# Patient Record
Sex: MALE | Race: WHITE | NOT HISPANIC OR LATINO | Employment: OTHER | URBAN - METROPOLITAN AREA
[De-identification: names, ages, dates, MRNs, and addresses within clinical notes are randomized per-mention and may not be internally consistent; named-entity substitution may affect disease eponyms.]

---

## 2019-10-01 ENCOUNTER — HOSPITAL ENCOUNTER (OUTPATIENT)
Facility: MEDICAL CENTER | Age: 34
End: 2019-10-02
Attending: EMERGENCY MEDICINE | Admitting: INTERNAL MEDICINE
Payer: OTHER MISCELLANEOUS

## 2019-10-01 ENCOUNTER — APPOINTMENT (OUTPATIENT)
Dept: RADIOLOGY | Facility: MEDICAL CENTER | Age: 34
End: 2019-10-01
Attending: STUDENT IN AN ORGANIZED HEALTH CARE EDUCATION/TRAINING PROGRAM
Payer: OTHER MISCELLANEOUS

## 2019-10-01 DIAGNOSIS — R55 SYNCOPE, UNSPECIFIED SYNCOPE TYPE: ICD-10-CM

## 2019-10-01 LAB
ALBUMIN SERPL BCP-MCNC: 4.6 G/DL (ref 3.2–4.9)
ALBUMIN/GLOB SERPL: 1.7 G/DL
ALP SERPL-CCNC: 83 U/L (ref 30–99)
ALT SERPL-CCNC: 16 U/L (ref 2–50)
AMPHET UR QL SCN: NEGATIVE
ANION GAP SERPL CALC-SCNC: 9 MMOL/L (ref 0–11.9)
APPEARANCE UR: CLEAR
AST SERPL-CCNC: 17 U/L (ref 12–45)
BARBITURATES UR QL SCN: NEGATIVE
BASOPHILS # BLD AUTO: 0.4 % (ref 0–1.8)
BASOPHILS # BLD: 0.06 K/UL (ref 0–0.12)
BENZODIAZ UR QL SCN: NEGATIVE
BILIRUB SERPL-MCNC: 0.4 MG/DL (ref 0.1–1.5)
BUN SERPL-MCNC: 17 MG/DL (ref 8–22)
BZE UR QL SCN: NEGATIVE
CALCIUM SERPL-MCNC: 9.2 MG/DL (ref 8.5–10.5)
CANNABINOIDS UR QL SCN: NEGATIVE
CHLORIDE SERPL-SCNC: 103 MMOL/L (ref 96–112)
CO2 SERPL-SCNC: 26 MMOL/L (ref 20–33)
COLOR UR AUTO: YELLOW
CREAT SERPL-MCNC: 0.96 MG/DL (ref 0.5–1.4)
D DIMER PPP IA.FEU-MCNC: <0.4 UG/ML (FEU) (ref 0–0.5)
EKG IMPRESSION: NORMAL
EOSINOPHIL # BLD AUTO: 0.08 K/UL (ref 0–0.51)
EOSINOPHIL NFR BLD: 0.6 % (ref 0–6.9)
ERYTHROCYTE [DISTWIDTH] IN BLOOD BY AUTOMATED COUNT: 42.2 FL (ref 35.9–50)
GLOBULIN SER CALC-MCNC: 2.7 G/DL (ref 1.9–3.5)
GLUCOSE SERPL-MCNC: 98 MG/DL (ref 65–99)
GLUCOSE UR QL STRIP.AUTO: NEGATIVE MG/DL
HCT VFR BLD AUTO: 44.1 % (ref 42–52)
HGB BLD-MCNC: 14.8 G/DL (ref 14–18)
IMM GRANULOCYTES # BLD AUTO: 0.05 K/UL (ref 0–0.11)
IMM GRANULOCYTES NFR BLD AUTO: 0.4 % (ref 0–0.9)
KETONES UR QL STRIP.AUTO: NEGATIVE MG/DL
LEUKOCYTE ESTERASE UR QL STRIP.AUTO: NEGATIVE
LYMPHOCYTES # BLD AUTO: 1.41 K/UL (ref 1–4.8)
LYMPHOCYTES NFR BLD: 10 % (ref 22–41)
MCH RBC QN AUTO: 30.1 PG (ref 27–33)
MCHC RBC AUTO-ENTMCNC: 33.6 G/DL (ref 33.7–35.3)
MCV RBC AUTO: 89.6 FL (ref 81.4–97.8)
METHADONE UR QL SCN: NEGATIVE
MONOCYTES # BLD AUTO: 1.14 K/UL (ref 0–0.85)
MONOCYTES NFR BLD AUTO: 8.1 % (ref 0–13.4)
NEUTROPHILS # BLD AUTO: 11.33 K/UL (ref 1.82–7.42)
NEUTROPHILS NFR BLD: 80.5 % (ref 44–72)
NITRITE UR QL STRIP.AUTO: NEGATIVE
NRBC # BLD AUTO: 0 K/UL
NRBC BLD-RTO: 0 /100 WBC
OPIATES UR QL SCN: NEGATIVE
OXYCODONE UR QL SCN: NEGATIVE
PCP UR QL SCN: NEGATIVE
PH UR STRIP.AUTO: 7 [PH] (ref 5–8)
PLATELET # BLD AUTO: 301 K/UL (ref 164–446)
PMV BLD AUTO: 10.9 FL (ref 9–12.9)
POTASSIUM SERPL-SCNC: 4 MMOL/L (ref 3.6–5.5)
PROPOXYPH UR QL SCN: NEGATIVE
PROT SERPL-MCNC: 7.3 G/DL (ref 6–8.2)
PROT UR QL STRIP: NEGATIVE MG/DL
RBC # BLD AUTO: 4.92 M/UL (ref 4.7–6.1)
RBC UR QL AUTO: ABNORMAL
SODIUM SERPL-SCNC: 138 MMOL/L (ref 135–145)
SP GR UR: 1.01 (ref 1–1.03)
TROPONIN T SERPL-MCNC: <6 NG/L (ref 6–19)
TROPONIN T SERPL-MCNC: <6 NG/L (ref 6–19)
WBC # BLD AUTO: 14.1 K/UL (ref 4.8–10.8)

## 2019-10-01 PROCEDURE — 93005 ELECTROCARDIOGRAM TRACING: CPT

## 2019-10-01 PROCEDURE — 99220 PR INITIAL OBSERVATION CARE,LEVL III: CPT | Performed by: INTERNAL MEDICINE

## 2019-10-01 PROCEDURE — G0378 HOSPITAL OBSERVATION PER HR: HCPCS

## 2019-10-01 PROCEDURE — 81002 URINALYSIS NONAUTO W/O SCOPE: CPT

## 2019-10-01 PROCEDURE — 99285 EMERGENCY DEPT VISIT HI MDM: CPT

## 2019-10-01 PROCEDURE — 700105 HCHG RX REV CODE 258: Performed by: INTERNAL MEDICINE

## 2019-10-01 PROCEDURE — 700105 HCHG RX REV CODE 258: Performed by: STUDENT IN AN ORGANIZED HEALTH CARE EDUCATION/TRAINING PROGRAM

## 2019-10-01 PROCEDURE — 80307 DRUG TEST PRSMV CHEM ANLYZR: CPT

## 2019-10-01 PROCEDURE — 85379 FIBRIN DEGRADATION QUANT: CPT

## 2019-10-01 PROCEDURE — 80053 COMPREHEN METABOLIC PANEL: CPT

## 2019-10-01 PROCEDURE — 70450 CT HEAD/BRAIN W/O DYE: CPT

## 2019-10-01 PROCEDURE — 93005 ELECTROCARDIOGRAM TRACING: CPT | Performed by: EMERGENCY MEDICINE

## 2019-10-01 PROCEDURE — 81002 URINALYSIS NONAUTO W/O SCOPE: CPT | Performed by: STUDENT IN AN ORGANIZED HEALTH CARE EDUCATION/TRAINING PROGRAM

## 2019-10-01 PROCEDURE — 84484 ASSAY OF TROPONIN QUANT: CPT

## 2019-10-01 PROCEDURE — 85025 COMPLETE CBC W/AUTO DIFF WBC: CPT

## 2019-10-01 RX ORDER — AMOXICILLIN 250 MG
2 CAPSULE ORAL 2 TIMES DAILY
Status: DISCONTINUED | OUTPATIENT
Start: 2019-10-01 | End: 2019-10-02 | Stop reason: HOSPADM

## 2019-10-01 RX ORDER — SODIUM CHLORIDE 9 MG/ML
INJECTION, SOLUTION INTRAVENOUS CONTINUOUS
Status: DISCONTINUED | OUTPATIENT
Start: 2019-10-01 | End: 2019-10-02 | Stop reason: HOSPADM

## 2019-10-01 RX ORDER — ONDANSETRON 2 MG/ML
4 INJECTION INTRAMUSCULAR; INTRAVENOUS EVERY 4 HOURS PRN
Status: DISCONTINUED | OUTPATIENT
Start: 2019-10-01 | End: 2019-10-02 | Stop reason: HOSPADM

## 2019-10-01 RX ORDER — ENALAPRILAT 1.25 MG/ML
1.25 INJECTION INTRAVENOUS EVERY 6 HOURS PRN
Status: DISCONTINUED | OUTPATIENT
Start: 2019-10-01 | End: 2019-10-02 | Stop reason: HOSPADM

## 2019-10-01 RX ORDER — ONDANSETRON 4 MG/1
4 TABLET, ORALLY DISINTEGRATING ORAL EVERY 4 HOURS PRN
Status: DISCONTINUED | OUTPATIENT
Start: 2019-10-01 | End: 2019-10-02 | Stop reason: HOSPADM

## 2019-10-01 RX ORDER — PROMETHAZINE HYDROCHLORIDE 25 MG/1
12.5-25 SUPPOSITORY RECTAL EVERY 4 HOURS PRN
Status: DISCONTINUED | OUTPATIENT
Start: 2019-10-01 | End: 2019-10-02 | Stop reason: HOSPADM

## 2019-10-01 RX ORDER — SODIUM CHLORIDE 9 MG/ML
20 INJECTION, SOLUTION INTRAVENOUS ONCE
Status: COMPLETED | OUTPATIENT
Start: 2019-10-01 | End: 2019-10-01

## 2019-10-01 RX ORDER — POLYETHYLENE GLYCOL 3350 17 G/17G
1 POWDER, FOR SOLUTION ORAL
Status: DISCONTINUED | OUTPATIENT
Start: 2019-10-01 | End: 2019-10-02 | Stop reason: HOSPADM

## 2019-10-01 RX ORDER — PROCHLORPERAZINE EDISYLATE 5 MG/ML
5-10 INJECTION INTRAMUSCULAR; INTRAVENOUS EVERY 4 HOURS PRN
Status: DISCONTINUED | OUTPATIENT
Start: 2019-10-01 | End: 2019-10-02 | Stop reason: HOSPADM

## 2019-10-01 RX ORDER — PROMETHAZINE HYDROCHLORIDE 25 MG/1
12.5-25 TABLET ORAL EVERY 4 HOURS PRN
Status: DISCONTINUED | OUTPATIENT
Start: 2019-10-01 | End: 2019-10-02 | Stop reason: HOSPADM

## 2019-10-01 RX ORDER — BISACODYL 10 MG
10 SUPPOSITORY, RECTAL RECTAL
Status: DISCONTINUED | OUTPATIENT
Start: 2019-10-01 | End: 2019-10-02 | Stop reason: HOSPADM

## 2019-10-01 RX ORDER — ACETAMINOPHEN 325 MG/1
650 TABLET ORAL EVERY 6 HOURS PRN
Status: DISCONTINUED | OUTPATIENT
Start: 2019-10-01 | End: 2019-10-02 | Stop reason: HOSPADM

## 2019-10-01 RX ADMIN — SODIUM CHLORIDE: 9 INJECTION, SOLUTION INTRAVENOUS at 22:33

## 2019-10-01 RX ADMIN — SODIUM CHLORIDE 1598 ML: 9 INJECTION, SOLUTION INTRAVENOUS at 17:04

## 2019-10-01 SDOH — HEALTH STABILITY: MENTAL HEALTH: HOW OFTEN DO YOU HAVE 6 OR MORE DRINKS ON ONE OCCASION?: NEVER

## 2019-10-01 SDOH — HEALTH STABILITY: MENTAL HEALTH: HOW OFTEN DO YOU HAVE A DRINK CONTAINING ALCOHOL?: MONTHLY OR LESS

## 2019-10-01 SDOH — HEALTH STABILITY: MENTAL HEALTH: HOW MANY STANDARD DRINKS CONTAINING ALCOHOL DO YOU HAVE ON A TYPICAL DAY?: 1 OR 2

## 2019-10-01 ASSESSMENT — ENCOUNTER SYMPTOMS
STRIDOR: 0
NAUSEA: 0
CONSTIPATION: 0
ABDOMINAL PAIN: 0
CHILLS: 0
DIZZINESS: 1
FALLS: 0
DEPRESSION: 0
VOMITING: 0
HEADACHES: 0
MYALGIAS: 0
WEAKNESS: 0
FEVER: 0
SPUTUM PRODUCTION: 0
SHORTNESS OF BREATH: 0
LOSS OF CONSCIOUSNESS: 1
TINGLING: 0
PALPITATIONS: 0
COUGH: 0
DIARRHEA: 0

## 2019-10-01 ASSESSMENT — PATIENT HEALTH QUESTIONNAIRE - PHQ9
2. FEELING DOWN, DEPRESSED, IRRITABLE, OR HOPELESS: NOT AT ALL
2. FEELING DOWN, DEPRESSED, IRRITABLE, OR HOPELESS: NOT AT ALL
1. LITTLE INTEREST OR PLEASURE IN DOING THINGS: NOT AT ALL
SUM OF ALL RESPONSES TO PHQ9 QUESTIONS 1 AND 2: 0
SUM OF ALL RESPONSES TO PHQ9 QUESTIONS 1 AND 2: 0

## 2019-10-01 ASSESSMENT — COPD QUESTIONNAIRES
DURING THE PAST 4 WEEKS HOW MUCH DID YOU FEEL SHORT OF BREATH: NONE/LITTLE OF THE TIME
HAVE YOU SMOKED AT LEAST 100 CIGARETTES IN YOUR ENTIRE LIFE: NO/DON'T KNOW
DO YOU EVER COUGH UP ANY MUCUS OR PHLEGM?: NO/ONLY WITH OCCASIONAL COLDS OR INFECTIONS
COPD SCREENING SCORE: 0
IN THE PAST 12 MONTHS DO YOU DO LESS THAN YOU USED TO BECAUSE OF YOUR BREATHING PROBLEMS: DISAGREE/UNSURE

## 2019-10-01 ASSESSMENT — LIFESTYLE VARIABLES
TOTAL SCORE: 0
HAVE YOU EVER FELT YOU SHOULD CUT DOWN ON YOUR DRINKING: NO
ALCOHOL_USE: YES
HOW MANY TIMES IN THE PAST YEAR HAVE YOU HAD 5 OR MORE DRINKS IN A DAY: 0
AVERAGE NUMBER OF DAYS PER WEEK YOU HAVE A DRINK CONTAINING ALCOHOL: 1
EVER_SMOKED: NEVER
DOES PATIENT WANT TO STOP DRINKING: NO
TOTAL SCORE: 0
CONSUMPTION TOTAL: NEGATIVE
EVER HAD A DRINK FIRST THING IN THE MORNING TO STEADY YOUR NERVES TO GET RID OF A HANGOVER: NO
HAVE PEOPLE ANNOYED YOU BY CRITICIZING YOUR DRINKING: NO
EVER FELT BAD OR GUILTY ABOUT YOUR DRINKING: NO
ON A TYPICAL DAY WHEN YOU DRINK ALCOHOL HOW MANY DRINKS DO YOU HAVE: 2
TOTAL SCORE: 0

## 2019-10-01 NOTE — ED PROVIDER NOTES
ED Provider Note        CHIEF COMPLAINT  Chief Complaint   Patient presents with   • Syncope       HPI  Van Mayer is a 34 y.o. male who presents to the Emergency Department following syncopal event on airplane.  He was flying from [place] to [place] and the roughly 2 hours into the flight was noted to pass out.  This was witnessed by nurse and doctor were on the plane.  Patient denied any chest pain or shortness of breath prior to to or following the event.  He denies any recent sick symptoms, neurological changes, nausea, vomiting, diarrhea, changes to bowel movements or urination.  He states that he had a normal meal for breakfast.  Patient has no significant cardiac history no pulmonary history and no previous issues with blood glucose levels.  Patient denies any lower extremity swelling or pain and no family history of blood clots.  He denies any drug use, alcohol use, smoking history.     REVIEW OF SYSTEMS  Constitutional: negative for fever, weight loss, chills  Eyes: Negative for discharge, erythema  HENT: Negative for runny nose, congestion, sore throat  CV: Negative for cyanosis, chest pain, or history of murmur  Resp: Negative for cough, difficulty breathing, stridor  GI: Negative for abdominal pain, nausea, vomiting, diarrhea, constipation  : Negative for dysuria, hematuria, decreased urine output  Neuro: Negative for seizures, weakness  Skin: Negative for rash, wound  Psych: Negative for behavior problems       PAST MEDICAL HISTORY  The patient has no chronic medical history. Some seasonal and food allergy, no anaphylaxis    SURGICAL HISTORY  patient denies any surgical history    SOCIAL HISTORY  The patient was accompanied to the ED with friend.    CURRENT MEDICATIONS  Home Medications     Reviewed by Georges Morgan (Pharmacy Tech) on 10/01/19 at 1535  Med List Status: Complete   Medication Last Dose Status        Patient Josh Taking any Medications                  "      ALLERGIES  Allergies   Allergen Reactions   • Demerol      Hives all over body       PHYSICAL EXAM  VITAL SIGNS: /83   Pulse 100   Temp 36.7 °C (98.1 °F) (Temporal)   Resp 19   Ht 1.854 m (6' 1\")   Wt 99.8 kg (220 lb)   SpO2 96%   BMI 29.03 kg/m²     Constitutional: Alert in no apparent distress.   HENT: Normocephalic, Atraumatic, Bilateral external ears normal, Nose normal. Moist mucous membranes.  Eyes: Pupils are equal and reactive, Conjunctiva normal   Ears: Normal TM Bilaterally   Throat: Midline uvula, no exudate.  Neck: Normal range of motion, No tenderness, Supple, No stridor. No evidence of meningeal irritation.  Lymphatic: No lymphadenopathy noted.   Cardiovascular: Tachycardic regular rhythm, no murmurs.   Thorax & Lungs: Normal breath sounds, No respiratory distress, No wheezing.    Abdomen: Soft, No tenderness, No masses.  Skin: Warm, Dry, No erythema, No rash, No Petechiae.   Musculoskeletal: Good range of motion in all major joints. No tenderness to palpation or major deformities noted.   Neurologic: Alert, Normal motor function, Normal sensory function, No focal deficits noted.  Cranial nerves III through XII intact no deficits bilateral upper and lower extremity strength 5 out of 5 sensation intact in all peripheral nerve distributions  Psychiatric: non-toxic in appearance and behavior.     LABS  Results for orders placed or performed during the hospital encounter of 10/01/19   CBC WITH DIFFERENTIAL   Result Value Ref Range    WBC 14.1 (H) 4.8 - 10.8 K/uL    RBC 4.92 4.70 - 6.10 M/uL    Hemoglobin 14.8 14.0 - 18.0 g/dL    Hematocrit 44.1 42.0 - 52.0 %    MCV 89.6 81.4 - 97.8 fL    MCH 30.1 27.0 - 33.0 pg    MCHC 33.6 (L) 33.7 - 35.3 g/dL    RDW 42.2 35.9 - 50.0 fL    Platelet Count 301 164 - 446 K/uL    MPV 10.9 9.0 - 12.9 fL    Neutrophils-Polys 80.50 (H) 44.00 - 72.00 %    Lymphocytes 10.00 (L) 22.00 - 41.00 %    Monocytes 8.10 0.00 - 13.40 %    Eosinophils 0.60 0.00 - 6.90 %    " Basophils 0.40 0.00 - 1.80 %    Immature Granulocytes 0.40 0.00 - 0.90 %    Nucleated RBC 0.00 /100 WBC    Neutrophils (Absolute) 11.33 (H) 1.82 - 7.42 K/uL    Lymphs (Absolute) 1.41 1.00 - 4.80 K/uL    Monos (Absolute) 1.14 (H) 0.00 - 0.85 K/uL    Eos (Absolute) 0.08 0.00 - 0.51 K/uL    Baso (Absolute) 0.06 0.00 - 0.12 K/uL    Immature Granulocytes (abs) 0.05 0.00 - 0.11 K/uL    NRBC (Absolute) 0.00 K/uL   COMP METABOLIC PANEL   Result Value Ref Range    Sodium 138 135 - 145 mmol/L    Potassium 4.0 3.6 - 5.5 mmol/L    Chloride 103 96 - 112 mmol/L    Co2 26 20 - 33 mmol/L    Anion Gap 9.0 0.0 - 11.9    Glucose 98 65 - 99 mg/dL    Bun 17 8 - 22 mg/dL    Creatinine 0.96 0.50 - 1.40 mg/dL    Calcium 9.2 8.5 - 10.5 mg/dL    AST(SGOT) 17 12 - 45 U/L    ALT(SGPT) 16 2 - 50 U/L    Alkaline Phosphatase 83 30 - 99 U/L    Total Bilirubin 0.4 0.1 - 1.5 mg/dL    Albumin 4.6 3.2 - 4.9 g/dL    Total Protein 7.3 6.0 - 8.2 g/dL    Globulin 2.7 1.9 - 3.5 g/dL    A-G Ratio 1.7 g/dL   ESTIMATED GFR   Result Value Ref Range    GFR If African American >60 >60 mL/min/1.73 m 2    GFR If Non African American >60 >60 mL/min/1.73 m 2   TROPONIN   Result Value Ref Range    Troponin T <6 6 - 19 ng/L   D-DIMER   Result Value Ref Range    D-Dimer Screen <0.40 0.00 - 0.50 ug/mL (FEU)   URINE DRUG SCREEN   Result Value Ref Range    Amphetamines Urine Negative Negative    Barbiturates Negative Negative    Benzodiazepines Negative Negative    Cocaine Metabolite Negative Negative    Methadone Negative Negative    Opiates Negative Negative    Oxycodone Negative Negative    Phencyclidine -Pcp Negative Negative    Propoxyphene Negative Negative    Cannabinoid Metab Negative Negative   POC UA   Result Value Ref Range    POC Color Yellow     POC Appearance Clear     POC Glucose Negative Negative mg/dL    POC Ketones Negative Negative mg/dL    POC Specific Gravity 1.010 1.005 - 1.030    POC Blood Trace-intact (A) Negative    POC Urine PH 7.0 5.0 - 8.0     POC Protein Negative Negative mg/dL    POC Nitrites Negative Negative    POC Leukocyte Esterase Negative Negative   EKG (NOW)   Result Value Ref Range    Report       [facility] Emergency Dept.    Test Date:  2019-10-01  Pt Name:    KENNEDY HUANG                  Department: ER  MRN:        [id number]                      Room:       RD 03  Gender:     Male                         Technician: [id number]  :        1985                   Requested By:ER TRIAGE PROTOCOL  Order #:    [id number]                    Reading MD:    Measurements  Intervals                                Axis  Rate:       106                          P:          59  AL:         144                          QRS:        26  QRSD:       86                           T:          31  QT:         332  QTc:        441    Interpretive Statements  SINUS TACHYCARDIA  PROBABLE POSTERIOR INFARCT  No previous ECG available for comparison       EKG shows sinus tachycardia rate of 106.  No ST elevation, no ST depression.  QTC is 441.    All labs reviewed by me.    RADIOLOGY  CT-HEAD W/O   Final Result      No CT evidence of acute infarct, hemorrhage or mass.        The radiologist's interpretation of all radiological studies have been reviewed by me.    COURSE & MEDICAL DECISION MAKING  Nursing notes, VS, PMSFHx reviewed in chart.    4:57 PM - Patient seen and examined at bedside.  Given fluid bolus, ordered UDS, troponins x2, d-dimer, UA which have come back negative so far.  6:00 PM - Patient reevaluated at bedside still tachycardic in the low 100s.  Patient stated had headache following syncopal event with improvement will get CT scan head wo/ given otherwise negative work-up so far    Decision Making:  The patient will be admitted to the hospitalist service for IV fluids, echo and telemetry monitoring.  His initial cardiac work-up is negative, however the patient is here from out of town, and planning on going on a 15 mile hike and so  he will need to have fluids and be cleared here prior to going on the psych.    OUTPATIENT MEDICATIONS:  New Prescriptions    No medications on file       Caregiver was given return precautions and verbalizes understanding. They will return with patient for new or worsening symptoms.     FINAL IMPRESSION  Syncope    I independently evaluated the patient and repeated the important components of the history and physical. I discussed the management with the resident. I have reviewed and agree with the pertinent clinical information above including history, exam, study findings, and recommendations.     Dr. Sinclair will admit the pt

## 2019-10-01 NOTE — ED TRIAGE NOTES
Pt was flying from [place] to [place] when he had a syncopal event during the flight.  Pt stats that he remember becoming very lightheaded and then blacked out.  Per ems, pt was unresponsive for 2 minutes.  Pt currently aaox3 minus event, gcs 15.  Triage process explained to patient.  Pt back to waiting room.  Pt instructed to inform RN if any changes or questions arise.

## 2019-10-02 ENCOUNTER — APPOINTMENT (OUTPATIENT)
Dept: CARDIOLOGY | Facility: MEDICAL CENTER | Age: 34
End: 2019-10-02
Attending: INTERNAL MEDICINE
Payer: OTHER MISCELLANEOUS

## 2019-10-02 VITALS
WEIGHT: 224.87 LBS | SYSTOLIC BLOOD PRESSURE: 136 MMHG | RESPIRATION RATE: 15 BRPM | DIASTOLIC BLOOD PRESSURE: 83 MMHG | TEMPERATURE: 97.5 F | HEART RATE: 78 BPM | BODY MASS INDEX: 29.8 KG/M2 | HEIGHT: 73 IN | OXYGEN SATURATION: 98 %

## 2019-10-02 PROBLEM — Z78.9 ALCOHOL USE: Status: ACTIVE | Noted: 2019-10-02

## 2019-10-02 PROBLEM — Z72.0 TOBACCO USE: Status: ACTIVE | Noted: 2019-10-02

## 2019-10-02 PROBLEM — R55 SYNCOPE: Status: ACTIVE | Noted: 2019-10-02

## 2019-10-02 PROBLEM — D72.829 LEUKOCYTOSIS: Status: ACTIVE | Noted: 2019-10-02

## 2019-10-02 PROBLEM — R55 SYNCOPE: Status: RESOLVED | Noted: 2019-10-02 | Resolved: 2019-10-02

## 2019-10-02 PROBLEM — D72.829 LEUKOCYTOSIS: Status: RESOLVED | Noted: 2019-10-02 | Resolved: 2019-10-02

## 2019-10-02 LAB
ANION GAP SERPL CALC-SCNC: 5 MMOL/L (ref 0–11.9)
BUN SERPL-MCNC: 14 MG/DL (ref 8–22)
CALCIUM SERPL-MCNC: 7.8 MG/DL (ref 8.5–10.5)
CHLORIDE SERPL-SCNC: 111 MMOL/L (ref 96–112)
CO2 SERPL-SCNC: 24 MMOL/L (ref 20–33)
CREAT SERPL-MCNC: 0.72 MG/DL (ref 0.5–1.4)
ERYTHROCYTE [DISTWIDTH] IN BLOOD BY AUTOMATED COUNT: 42.4 FL (ref 35.9–50)
GLUCOSE SERPL-MCNC: 90 MG/DL (ref 65–99)
HCT VFR BLD AUTO: 41.2 % (ref 42–52)
HGB BLD-MCNC: 13.6 G/DL (ref 14–18)
LV EJECT FRACT  99904: 55
LV EJECT FRACT MOD 2C 99903: 56.8
LV EJECT FRACT MOD 4C 99902: 60.01
LV EJECT FRACT MOD BP 99901: 58.11
MCH RBC QN AUTO: 30 PG (ref 27–33)
MCHC RBC AUTO-ENTMCNC: 33 G/DL (ref 33.7–35.3)
MCV RBC AUTO: 90.7 FL (ref 81.4–97.8)
PLATELET # BLD AUTO: 264 K/UL (ref 164–446)
PMV BLD AUTO: 10.3 FL (ref 9–12.9)
POTASSIUM SERPL-SCNC: 4 MMOL/L (ref 3.6–5.5)
RBC # BLD AUTO: 4.54 M/UL (ref 4.7–6.1)
SODIUM SERPL-SCNC: 140 MMOL/L (ref 135–145)
WBC # BLD AUTO: 9.7 K/UL (ref 4.8–10.8)

## 2019-10-02 PROCEDURE — 700105 HCHG RX REV CODE 258: Performed by: INTERNAL MEDICINE

## 2019-10-02 PROCEDURE — 80048 BASIC METABOLIC PNL TOTAL CA: CPT

## 2019-10-02 PROCEDURE — 99217 PR OBSERVATION CARE DISCHARGE: CPT | Performed by: INTERNAL MEDICINE

## 2019-10-02 PROCEDURE — 93306 TTE W/DOPPLER COMPLETE: CPT

## 2019-10-02 PROCEDURE — 36415 COLL VENOUS BLD VENIPUNCTURE: CPT

## 2019-10-02 PROCEDURE — G0378 HOSPITAL OBSERVATION PER HR: HCPCS

## 2019-10-02 PROCEDURE — 93306 TTE W/DOPPLER COMPLETE: CPT | Mod: 26 | Performed by: INTERNAL MEDICINE

## 2019-10-02 PROCEDURE — 85027 COMPLETE CBC AUTOMATED: CPT

## 2019-10-02 RX ADMIN — SODIUM CHLORIDE: 9 INJECTION, SOLUTION INTRAVENOUS at 08:51

## 2019-10-02 ASSESSMENT — PATIENT HEALTH QUESTIONNAIRE - PHQ9
2. FEELING DOWN, DEPRESSED, IRRITABLE, OR HOPELESS: NOT AT ALL
1. LITTLE INTEREST OR PLEASURE IN DOING THINGS: NOT AT ALL
SUM OF ALL RESPONSES TO PHQ9 QUESTIONS 1 AND 2: 0

## 2019-10-02 NOTE — ASSESSMENT & PLAN NOTE
-Patient smokes cigars occasionally, does not inhale  -He does not use tobacco enough to require patch

## 2019-10-02 NOTE — DISCHARGE PLANNING
Care Transition Team Assessment    Spoke with patient @ bedside. Anticipate no needs at present time. Friends will be ride @ D/C.    Information Source  Orientation : Oriented x 4  Information Given By: Patient    Readmission Evaluation  Is this a readmission?: No    Interdisciplinary Discharge Planning  Does Admitting Nurse Feel This Could be a Complex Discharge?: No  Primary Care Physician: None  Lives with - Patient's Self Care Capacity: Spouse, Child Less than 18 Years of Age  Patient or legal guardian wants to designate a caregiver (see row info): No  Support Systems: Family Member(s), Friends / Neighbors, Spouse / Significant Other  Housing / Facility: [address]  Do You Take your Prescribed Medications Regularly: Yes  Able to Return to Previous ADL's: Yes  Mobility Issues: No  Prior Services: Home-Independent  Patient Expects to be Discharged to:: Home  Assistance Needed: No  Durable Medical Equipment: Not Applicable    Discharge Preparedness  What are your discharge supports?: Spouse, Other (comment)(Friends)  Prior Functional Level: Ambulatory    Functional Assesment  Prior Functional Level: Ambulatory    Finances  Prescription Coverage: Yes    Anticipated Discharge Information  Anticipated discharge disposition: Home  Discharge Contact Phone Number: [phone]

## 2019-10-02 NOTE — PROGRESS NOTES
Spiritual Care Note    Patient Information     Patient's Name: Van Mayer   MRN: [id number]    YOB: 1985   Age and Gender: 34 y.o. male   Service Area: ED [facility]   Room (and Bed):  03/03 RED   Ethnicity or Nationality: Caucasion   Primary Language: English   Sabianism/Spiritual preference: Buddhist   Place of Residence: [place], [place]   Family/Friends/Others Present: Yes   Clinical Team Present: No   Medical Diagnosis(-es)/Procedure(s): Syncope   Code Status: No Order    Date of Admission: 10/1/2019   Length of Stay: 0 days        Spiritual Care Provider Information:  Name of Spiritual Care Provider: Isabel Ralph  Title of Spiritual Care Provider: Associate   Phone Number: [phone]  E-mail: [email or web address]  Total time : 10 minutes    Spiritual Screen Results:    Gen Nursing        Palliative Care         Encounter/Request Information  Encounter/Request Type   Visited With: Patient(Patient and friend)  Nature of the Visit: Initial  Continue Visiting: No  General Visit: Yes  Referral From/ Origin of Request: [place] rounds, Verbal patient    Religous Needs/Values  Sabianism Needs Visit  Sabianism Needs: Prayer  Ritual Needs Visit  Ritual Needs: [place]    Spiritual Assessment     Spiritual Care Encounters    Observations/Symptoms: Accepting, Thankfulness    Interaction/Conversation: Pt stated that he was flying from [place] to [place] to visit [place], and passed out on the plane.  A friend was with him in the ED.  The  prayed with the pt and offered a blessing, for which he was grateful.    Assessment: Need    Need: Seeking Spiritual Assistance and Support    Interventions: Conversation, prayer, blessing    Outcomes: Connectedness with the Holy/with God, Spiritual Comfort    Plan: No Further Visits    Notes:

## 2019-10-02 NOTE — DISCHARGE INSTRUCTIONS
Discharge Instructions    Discharged to home by car with friend. Discharged via walking, hospital escort: Yes.  Special equipment needed: Not Applicable    Be sure to schedule a follow-up appointment with your primary care doctor or any specialists as instructed.     Discharge Plan:   Diet Plan: Discussed  Activity Level: Discussed  Confirmed Follow up Appointment: Patient to Call and Schedule Appointment  Confirmed Symptoms Management: Discussed  Medication Reconciliation Updated: Yes  Influenza Vaccine Indication: Patient Refuses    I understand that a diet low in cholesterol, fat, and sodium is recommended for good health. Unless I have been given specific instructions below for another diet, I accept this instruction as my diet prescription.   Other diet: heart healthy    Special Instructions: None    · Is patient discharged on Warfarin / Coumadin?   No     Depression / Suicide Risk    As you are discharged from this [facility] facility, it is important to learn how to keep safe from harming yourself.    Recognize the warning signs:  · Abrupt changes in personality, positive or negative- including increase in energy   · Giving away possessions  · Change in eating patterns- significant weight changes-  positive or negative  · Change in sleeping patterns- unable to sleep or sleeping all the time   · Unwillingness or inability to communicate  · Depression  · Unusual sadness, discouragement and loneliness  · Talk of wanting to die  · Neglect of personal appearance   · Rebelliousness- reckless behavior  · Withdrawal from people/activities they love  · Confusion- inability to concentrate     If you or a loved one observes any of these behaviors or has concerns about self-harm, here's what you can do:  · Talk about it- your feelings and reasons for harming yourself  · Remove any means that you might use to hurt yourself (examples: pills, rope, extension cords, firearm)  · Get professional help from the community  (Mental Health, Substance Abuse, psychological counseling)  · Do not be alone:Call your Safe Contact- someone whom you trust who will be there for you.  · Call your local CRISIS HOTLINE 931-5625 or 963-834-6114  · Call your local Children's Mobile Crisis Response Team [place] (159) 107-2271 or www.[employer]  · Call the toll free National Suicide Prevention Hotlines   · National Suicide Prevention Lifeline 805-800-TEZV (6003)  · [place] [employer]et[employer] Line Network 800-SUICIDE (969-2834)      Discharge Instructions per Yakov Lin, A.P.R.N.    Maintain adequate daily oral hydration of 2 L/day.  Recommend cardiology evaluation for   Holter monitoring if syncope recurs.     Return to ER if patient has recurrent syncope, unilateral weakness, slurred speech, altered mental status, or altered level of consciousness.

## 2019-10-02 NOTE — H&P
Hospital Medicine History & Physical Note    Date of Service  10/1/2019    Primary Care Physician  Pcp Pt States None    Consultants  None    Code Status  Full    Chief Complaint  Loss of consciousness    History of Presenting Illness  34 y.o. male who presented 10/1/2019 with syncopal episode.  Patient states he was in his usual state of health, was on a flight back to [place].  Patient states he had a layover in [place], during the flight from [place] to [place] he began experiencing dizziness and then noted his vision narrowed.  Next thing he knew he was waking up with people surrounding him.  Patient states he was in his usual state of health the entire trip up until this point in time, had been eating and drinking and did not experience any issues.  Whenever I asked him about previous syncopal episodes, he states he did have one about 9 years ago after a workout when he was dehydrated.  Patient states he was sitting in his seat on the airplane and did not have any trauma.  He was told he was out for about 2 minutes.  I did discuss the case including labs and imaging with the ER physician.    Review of Systems  Review of Systems   Constitutional: Negative for chills, fever and malaise/fatigue.   HENT: Negative for congestion.    Eyes:        Vision change   Respiratory: Negative for cough, sputum production, shortness of breath and stridor.    Cardiovascular: Negative for chest pain, palpitations and leg swelling.   Gastrointestinal: Negative for abdominal pain, constipation, diarrhea, nausea and vomiting.   Genitourinary: Negative for dysuria and urgency.   Musculoskeletal: Negative for falls and myalgias.   Neurological: Positive for dizziness and loss of consciousness. Negative for tingling, weakness and headaches.   Psychiatric/Behavioral: Negative for depression and suicidal ideas.   All other systems reviewed and are negative.      Past Medical History  None    Surgical History  None    Family History  Patient  denies any medical problems in the family    Social History   reports that he has never smoked. He has never used smokeless tobacco. He reports that he drinks alcohol. He reports that he does not use drugs.    Allergies  Allergies   Allergen Reactions   • Demerol      Hives all over body       Medications  None       Physical Exam  Temp:  [36.7 °C (98.1 °F)] 36.7 °C (98.1 °F)  Pulse:  [] 86  Resp:  [10-21] 18  BP: (124-141)/(69-95) 131/95  SpO2:  [95 %-100 %] 97 %    Physical Exam   Constitutional: He is oriented to person, place, and time. He appears well-developed and well-nourished.  Non-toxic appearance. No distress.   HENT:   Head: Normocephalic and atraumatic. Not macrocephalic and not microcephalic. Head is without raccoon's eyes and without Cote's sign.   Right Ear: External ear normal.   Left Ear: External ear normal.   Mouth/Throat: Oropharynx is clear and moist. No oropharyngeal exudate.   Eyes: Conjunctivae are normal. Right eye exhibits no discharge. Left eye exhibits no discharge. No scleral icterus.   Neck: Normal range of motion. Neck supple. No tracheal deviation, no edema and no erythema present.   Cardiovascular: Normal rate, regular rhythm, normal heart sounds and intact distal pulses. Exam reveals no gallop, no friction rub and no decreased pulses.   No murmur heard.  Pulmonary/Chest: Effort normal and breath sounds normal. No stridor. No respiratory distress. He has no decreased breath sounds. He has no wheezes. He has no rhonchi. He has no rales. He exhibits no tenderness.   Abdominal: Soft. Bowel sounds are normal. He exhibits no distension. There is no splenomegaly or hepatomegaly. There is no tenderness. There is no rebound and no guarding.   Musculoskeletal: Normal range of motion. He exhibits no edema, tenderness or deformity.   Lymphadenopathy:     He has no cervical adenopathy.   Neurological: He is alert and oriented to person, place, and time. No cranial nerve deficit.  Coordination normal.   Skin: Skin is warm and dry. No rash noted. He is not diaphoretic. No cyanosis or erythema. No pallor. Nails show no clubbing.   Psychiatric: He has a normal mood and affect. His speech is normal and behavior is normal. Judgment and thought content normal. Cognition and memory are normal.   Nursing note and vitals reviewed.      Laboratory:  Recent Labs     10/01/19  1517   WBC 14.1*   RBC 4.92   HEMOGLOBIN 14.8   HEMATOCRIT 44.1   MCV 89.6   MCH 30.1   MCHC 33.6*   RDW 42.2   PLATELETCT 301   MPV 10.9     Recent Labs     10/01/19  1517   SODIUM 138   POTASSIUM 4.0   CHLORIDE 103   CO2 26   GLUCOSE 98   BUN 17   CREATININE 0.96   CALCIUM 9.2     Recent Labs     10/01/19  1517   ALTSGPT 16   ASTSGOT 17   ALKPHOSPHAT 83   TBILIRUBIN 0.4   GLUCOSE 98         No results for input(s): NTPROBNP in the last 72 hours.      Recent Labs     10/01/19  1658 10/01/19  1849   TROPONINT <6 <6       Urinalysis:    No results found     Imaging:  CT-HEAD W/O   Final Result      No CT evidence of acute infarct, hemorrhage or mass.      EC-ECHOCARDIOGRAM COMPLETE W/O CONT    (Results Pending)         Assessment/Plan:  I anticipate this patient is appropriate for observation status at this time.    * Syncope- (present on admission)  Assessment & Plan  -Concerning for cardiac cause, he does require monitoring on telemetry  -Obtain echocardiogram  -Possibly due to dehydration, start IV fluids  -Obtain orthostatic blood pressures  -I did personally review the CT head, noted no acute intracranial abnormality    Tobacco use  Assessment & Plan  -Patient smokes cigars occasionally, does not inhale  -He does not use tobacco enough to require patch    Alcohol use  Assessment & Plan  -Patient just drinks occasionally, he should not have a significant risk for alcohol withdrawal    Leukocytosis  Assessment & Plan  -Likely reactive, no additional sign of infection  -No need for antibiotics at this time      VTE prophylaxis:  Lovenox

## 2019-10-02 NOTE — DISCHARGE SUMMARY
Discharge Summary    CHIEF COMPLAINT ON ADMISSION  Chief Complaint   Patient presents with   • Syncope       Reason for Admission  EMS     Admission Date  10/1/2019    CODE STATUS  Full Code    HPI & HOSPITAL COURSE  This is a 34 y.o. male here after having a witnessed syncopal episode while on a flight.  The patient had reported feeling dizzy with narrowing vision prior to event.  He regained consciousness shortly after and returned to his mental baseline.      On admission his laboratory workup was negative for significant abnormalities.  EKG was stable.  CT head was negative for intracranial abnormalities.  Echocardiogram was stable with an LVEF of 55%.  His vital signs remained stable.      He is currently at his mental and physical baseline.  The exact etiology of his symptoms is unknown.  Dehydration vs vasovagal event are the suspected etiologies.  If he has recurrent syncope he is recommended to have evaluation for dysrhythmias through outpatient heart monitoring.  This is can be followed by his PCP.    At this time the patient is medically stable and has no further need for acute intervention.  He is safe for discharge with close outpatient follow up.        Therefore, he is discharged in good and stable condition to home with close outpatient follow-up.    Discharge Date  10/2/2019    DISCHARGE DIAGNOSES  Principal Problem (Resolved):    Syncope POA: Yes  Active Problems:    Alcohol use POA: Unknown    Tobacco use POA: Unknown  Resolved Problems:    Leukocytosis POA: Unknown      FOLLOW UP  He is recommended to follow with his PCP in 1-2 weeks.     MEDICATIONS ON DISCHARGE     Medication List      You have not been prescribed any medications.         Allergies  Allergies   Allergen Reactions   • Amoxicillin      When he was young does not remember side effects   • Demerol      Hives all over body       DIET  Orders Placed This Encounter   Procedures   • Diet Order Regular     Standing Status:   Standing      Number of Occurrences:   1     Order Specific Question:   Diet:     Answer:   Regular [1]       ACTIVITY  As tolerated.  Weight bearing as tolerated    LABORATORY  Lab Results   Component Value Date    SODIUM 140 10/02/2019    POTASSIUM 4.0 10/02/2019    CHLORIDE 111 10/02/2019    CO2 24 10/02/2019    GLUCOSE 90 10/02/2019    BUN 14 10/02/2019    CREATININE 0.72 10/02/2019        Lab Results   Component Value Date    WBC 9.7 10/02/2019    HEMOGLOBIN 13.6 (L) 10/02/2019    HEMATOCRIT 41.2 (L) 10/02/2019    PLATELETCT 264 10/02/2019

## 2019-10-02 NOTE — CARE PLAN
Problem: Safety  Goal: Will remain free from injury  Outcome: PROGRESSING AS EXPECTED  Goal: Will remain free from falls  Outcome: PROGRESSING AS EXPECTED     Problem: Safety  Goal: Will remain free from falls  Outcome: PROGRESSING AS EXPECTED

## 2019-10-02 NOTE — CARE PLAN
Pt declines feelings of dizziness, vertigo, gen weakness, will monitor. Fall precautions in place, educated to call for assistance. POC discussed, verbalized understanding.

## 2019-10-02 NOTE — PROGRESS NOTES
Pt arrived to unit via wheelair telemonitored with ACLS RN at 2215. Pt oriented to room, unit, and plan of care. VSS, negative orthostatics. Tele-monitor placed.SR. Admit profile and assessment completed. AOx4, no neuro deficits states some fatigue. No pain at this time,will monitor. Unlabored and even breathing,RA. Swallow eval passed, declines food at this time. Skin intact. Extra pillows and blankets. IV infusing per MAR. Whiteboard updated. All questions answered at this time. Call light within reach; fall precautions in place. MD notifed of patients arrival.

## 2024-11-25 NOTE — ASSESSMENT & PLAN NOTE
-Concerning for cardiac cause, he does require monitoring on telemetry  -Obtain echocardiogram  -Possibly due to dehydration, start IV fluids  -Obtain orthostatic blood pressures  -I did personally review the CT head, noted no acute intracranial abnormality   Thank you for allowing us to be a part of your care today. We strive to provide the best care for you today and in the future. Here are a few important reminders:   Our goal is to review and report all test/imaging results within 24-48 hours (unless otherwise specified by the clinician). If you have not received your test results within this time period, please contact the clinic at [phone] and ask to speak to a RN Specialist or Cancer Care Coordinator.   You can also receive your test results on-line quickly and easily by enrolling in [facility] by visiting [email or web address].  Central Scheduling should contact you in 24-48 hours if any imaging is ordered during this visit. Please contact Central Scheduling at [phone] in case you do not receive a call.   Due to the recent change in narcotic laws and our commitment to patient safety and well-being, narcotic refills will be evaluated on a strict case by case basis. We will not address any requests for re-fills after noon on Fridays.   If you have paperwork, complete your portion of the paperwork and either drop off or fax the forms to [phone]. Make sure to leave clear instruction of where you would like the completed paperwork to go and include a valid phone number. It may take staff up to 7 days to complete.